# Patient Record
Sex: MALE | Race: AMERICAN INDIAN OR ALASKA NATIVE | URBAN - METROPOLITAN AREA
[De-identification: names, ages, dates, MRNs, and addresses within clinical notes are randomized per-mention and may not be internally consistent; named-entity substitution may affect disease eponyms.]

---

## 2022-06-10 ENCOUNTER — OFFICE VISIT (OUTPATIENT)
Dept: URBAN - METROPOLITAN AREA CLINIC 24 | Facility: CLINIC | Age: 72
End: 2022-06-10
Payer: OTHER GOVERNMENT

## 2022-06-10 DIAGNOSIS — H25.813 COMBINED FORMS OF AGE-RELATED CATARACT, BILATERAL: Primary | ICD-10-CM

## 2022-06-10 PROCEDURE — 92250 FUNDUS PHOTOGRAPHY W/I&R: CPT | Performed by: OPTOMETRIST

## 2022-06-10 PROCEDURE — 99204 OFFICE O/P NEW MOD 45 MIN: CPT | Performed by: OPTOMETRIST

## 2022-06-10 ASSESSMENT — VISUAL ACUITY
OD: CF 4FT
OS: 20/200

## 2022-06-10 ASSESSMENT — INTRAOCULAR PRESSURE
OD: 10
OS: 12

## 2022-06-10 ASSESSMENT — KERATOMETRY
OS: 41.74
OD: 41.77

## 2022-06-10 NOTE — IMPRESSION/PLAN
Impression: Combined forms of age-related cataract, bilateral: H25.813. Plan: Cataracts are dense enough to account for patient's vision. However, with decreased view into the fundus cannot determine if there is any retinal pathology which may prohibit improved vision post operatively. Patient wishes to proceed with cataract surgery OD then OS. Aim: plano OU.

## 2023-06-28 NOTE — IMPRESSION/PLAN
Impression: Type 2 diabetes mellitus w/o complication: G38.7. Plan: No view 2/2 dense cataracts, reassess after CE IOL OU. Discussed with patient, understands this may limit vision after surgery.

## 2023-06-28 NOTE — IMPRESSION/PLAN
Impression: Combined forms of age-related cataract, bilateral: H25.813. Plan: Discussed cataract diagnosis with the patient. Discussed risks, benefits and alternatives to surgery including but not limited to: bleeding, infection, risk of vision loss, loss of the eye, need for other surgery. Patient voiced understanding and wishes to proceed. Patient elects surgical treatment. Advanced technology options Discussed with patient . Patient desires surgery OU, OS first (( AIM -0.25  OU, injectable  1st choice/TRI MOXI, +BLOCK OU, NO ORA, NO LRI, AMP; MATURE OU-TRYPAN BLUE)) Patient understands the need for glasses after surgery for BCVA. Understands higher risk of complication and delayed healing due to dense cataract.

## 2023-07-10 NOTE — IMPRESSION/PLAN
Impression: S/P Cataract Extraction by phacoemulsification with IOL placement OS - 7 Days. Encounter for surgical aftercare following surgery on a sense organ  Z48.810. Plan: Educated pt that inflammation & edema still present today Start PRED QID OS, taper

## 2023-07-17 NOTE — IMPRESSION/PLAN
Impression: S/P Cataract Extraction by phacoemulsification with IOL placement OS - 14 Days. Encounter for surgical aftercare following surgery on a sense organ  Z48.810. Plan: Pt educated healing well but very slowly. Trace inflammation still present today. ERM on OCT MAC Continue Pred QID OS
RTC Thursday for PO to determine if we can move fwd with OD

## 2023-07-20 NOTE — IMPRESSION/PLAN
Impression: S/P Cataract Extraction by phacoemulsification with IOL placement OS - 17 Days. Encounter for surgical aftercare following surgery on a sense organ  Z48.810. Plan: Pt edu edema resolved OS. Start taper, Pred TID OS then weekly taper. Ok to proceed with cat sx OD.